# Patient Record
Sex: FEMALE | URBAN - METROPOLITAN AREA
[De-identification: names, ages, dates, MRNs, and addresses within clinical notes are randomized per-mention and may not be internally consistent; named-entity substitution may affect disease eponyms.]

---

## 2018-06-13 ENCOUNTER — OFFICE VISIT (OUTPATIENT)
Dept: URBAN - METROPOLITAN AREA CLINIC 40 | Facility: CLINIC | Age: 78
End: 2018-06-13
Payer: MEDICARE

## 2018-06-13 DIAGNOSIS — H52.4 PRESBYOPIA: ICD-10-CM

## 2018-06-13 DIAGNOSIS — H40.1131 PRIMARY OPEN-ANGLE GLAUCOMA, BILATERAL, MILD STAGE: Primary | ICD-10-CM

## 2018-06-13 PROCEDURE — 92015 DETERMINE REFRACTIVE STATE: CPT | Performed by: OPTOMETRIST

## 2018-06-13 PROCEDURE — 99204 OFFICE O/P NEW MOD 45 MIN: CPT | Performed by: OPTOMETRIST

## 2018-06-13 ASSESSMENT — INTRAOCULAR PRESSURE
OS: 16
OD: 16

## 2018-06-13 ASSESSMENT — VISUAL ACUITY
OD: 20/30
OS: 20/40

## 2018-06-13 ASSESSMENT — KERATOMETRY
OS: 46.25
OD: 46.25

## 2018-06-13 NOTE — IMPRESSION/PLAN
Impression: Presbyopia: H52.4. Plan: Discussed diagnosis in detail with patient. New glasses Rx was given today.

## 2018-06-13 NOTE — IMPRESSION/PLAN
Impression: Primary open-angle glaucoma, bilateral, mild stage: H40.1131. Plan: Intraocular pressure well controlled, tolerating medications. Will continue with same regimen.

## 2019-10-15 ENCOUNTER — OFFICE VISIT (OUTPATIENT)
Dept: URBAN - METROPOLITAN AREA CLINIC 45 | Facility: CLINIC | Age: 79
End: 2019-10-15
Payer: MEDICARE

## 2019-10-15 DIAGNOSIS — Z96.1 PRESENCE OF INTRAOCULAR LENS: ICD-10-CM

## 2019-10-15 DIAGNOSIS — E11.9 TYPE 2 DIABETES MELLITUS W/O COMPLICATION: Primary | ICD-10-CM

## 2019-10-15 PROCEDURE — 92133 CPTRZD OPH DX IMG PST SGM ON: CPT | Performed by: OPTOMETRIST

## 2019-10-15 PROCEDURE — 92014 COMPRE OPH EXAM EST PT 1/>: CPT | Performed by: OPTOMETRIST

## 2019-10-15 RX ORDER — LATANOPROST 50 UG/ML
0.005 % SOLUTION OPHTHALMIC
Qty: 1 | Refills: 3 | Status: INACTIVE
Start: 2019-10-15 | End: 2020-04-06

## 2019-10-15 ASSESSMENT — INTRAOCULAR PRESSURE
OS: 14
OD: 15

## 2019-10-15 ASSESSMENT — VISUAL ACUITY
OS: 20/60
OD: 20/30

## 2019-10-15 NOTE — IMPRESSION/PLAN
Impression: Presence of intraocular lens: Z96.1. OU. Plan: Condition is stable, no further treatment at this time. Update SRX as needed. check yearly.

## 2019-10-15 NOTE — IMPRESSION/PLAN
Impression: Primary open-angle glaucoma, bilateral, mild stage: H40.1131. Plan: Prior POAG diagnosis, pt not using medications for past year - against medical advice. IOP 15/14 today. OCT performed and reviewed. Resume latanoprost QHS OU. RTC 4 months x IOP check, HVF 24-2.

## 2019-10-15 NOTE — IMPRESSION/PLAN
Impression: Type 2 diabetes mellitus w/o complication: E32.9. OU. Plan: No Non-Proliferative Diabetic Retinopathy, no Diabetic Macular Edema and no Neovascularization of the iris, disc, or elsewhere. Discussed ocular and systemic benefits of blood sugar control. Send notes to PCP. Check annually. RTC 1 year x CEE.